# Patient Record
Sex: MALE | Race: WHITE
[De-identification: names, ages, dates, MRNs, and addresses within clinical notes are randomized per-mention and may not be internally consistent; named-entity substitution may affect disease eponyms.]

---

## 2021-12-03 ENCOUNTER — HOSPITAL ENCOUNTER (INPATIENT)
Dept: HOSPITAL 26 - MED | Age: 46
LOS: 4 days | Discharge: HOME | DRG: 720 | End: 2021-12-07
Payer: MEDICAID

## 2021-12-03 VITALS — DIASTOLIC BLOOD PRESSURE: 100 MMHG | SYSTOLIC BLOOD PRESSURE: 207 MMHG

## 2021-12-03 VITALS — HEIGHT: 74 IN | WEIGHT: 238 LBS | BODY MASS INDEX: 30.54 KG/M2

## 2021-12-03 VITALS — SYSTOLIC BLOOD PRESSURE: 159 MMHG | DIASTOLIC BLOOD PRESSURE: 85 MMHG

## 2021-12-03 DIAGNOSIS — E66.9: ICD-10-CM

## 2021-12-03 DIAGNOSIS — E11.621: ICD-10-CM

## 2021-12-03 DIAGNOSIS — I31.3: ICD-10-CM

## 2021-12-03 DIAGNOSIS — Z20.822: ICD-10-CM

## 2021-12-03 DIAGNOSIS — I70.249: ICD-10-CM

## 2021-12-03 DIAGNOSIS — E78.5: ICD-10-CM

## 2021-12-03 DIAGNOSIS — M21.622: ICD-10-CM

## 2021-12-03 DIAGNOSIS — Z86.16: ICD-10-CM

## 2021-12-03 DIAGNOSIS — J90: ICD-10-CM

## 2021-12-03 DIAGNOSIS — A41.9: Primary | ICD-10-CM

## 2021-12-03 DIAGNOSIS — L97.529: ICD-10-CM

## 2021-12-03 DIAGNOSIS — E11.00: ICD-10-CM

## 2021-12-03 DIAGNOSIS — L03.116: ICD-10-CM

## 2021-12-03 DIAGNOSIS — E44.0: ICD-10-CM

## 2021-12-03 DIAGNOSIS — E11.65: ICD-10-CM

## 2021-12-03 DIAGNOSIS — I10: ICD-10-CM

## 2021-12-03 DIAGNOSIS — R06.02: ICD-10-CM

## 2021-12-03 LAB
ALBUMIN FLD-MCNC: 2.8 G/DL (ref 3.4–5)
ANION GAP SERPL CALCULATED.3IONS-SCNC: 9.2 MMOL/L (ref 8–16)
APPEARANCE UR: CLEAR
AST SERPL-CCNC: 20 U/L (ref 15–37)
BARBITURATES UR QL SCN: NEGATIVE NG/ML
BASOPHILS # BLD AUTO: 0 K/UL (ref 0–0.22)
BASOPHILS NFR BLD AUTO: 0.4 % (ref 0–2)
BENZODIAZ UR QL SCN: NEGATIVE NG/ML
BILIRUB SERPL-MCNC: 0.7 MG/DL (ref 0–1)
BILIRUB UR QL STRIP: NEGATIVE
BUN SERPL-MCNC: 24 MG/DL (ref 7–18)
BZE UR QL SCN: NEGATIVE NG/ML
CANNABINOIDS UR QL SCN: NEGATIVE NG/ML
CHLORIDE SERPL-SCNC: 104 MMOL/L (ref 98–107)
CO2 SERPL-SCNC: 27.9 MMOL/L (ref 21–32)
COLOR UR: YELLOW
CREAT SERPL-MCNC: 1.1 MG/DL (ref 0.6–1.3)
EOSINOPHIL # BLD AUTO: 0 K/UL (ref 0–0.4)
EOSINOPHIL NFR BLD AUTO: 0.5 % (ref 0–4)
ERYTHROCYTE [DISTWIDTH] IN BLOOD BY AUTOMATED COUNT: 13 % (ref 11.6–13.7)
GFR SERPL CREATININE-BSD FRML MDRD: 93 ML/MIN (ref 90–?)
GLUCOSE SERPL-MCNC: 368 MG/DL (ref 74–106)
GLUCOSE UR STRIP-MCNC: (no result) MG/DL
HCT VFR BLD AUTO: 34.6 % (ref 36–52)
HGB BLD-MCNC: 11.6 G/DL (ref 12–18)
HGB UR QL STRIP: (no result)
LEUKOCYTE ESTERASE UR QL STRIP: NEGATIVE
LYMPHOCYTES # BLD AUTO: 0.5 K/UL (ref 2–11.5)
LYMPHOCYTES NFR BLD AUTO: 7 % (ref 20.5–51.1)
MAGNESIUM SERPL-MCNC: 2.2 MG/DL (ref 1.8–2.4)
MCH RBC QN AUTO: 29 PG (ref 27–31)
MCHC RBC AUTO-ENTMCNC: 34 G/DL (ref 33–37)
MCV RBC AUTO: 85 FL (ref 80–94)
MONOCYTES # BLD AUTO: 0.8 K/UL (ref 0.8–1)
MONOCYTES NFR BLD AUTO: 11.2 % (ref 1.7–9.3)
NEUTROPHILS # BLD AUTO: 5.8 K/UL (ref 1.8–7.7)
NEUTROPHILS NFR BLD AUTO: 80.9 % (ref 42.2–75.2)
NITRITE UR QL STRIP: NEGATIVE
OPIATES UR QL SCN: NEGATIVE NG/ML
PCP UR QL SCN: NEGATIVE NG/ML
PH UR STRIP: 5.5 [PH] (ref 5–9)
PHOSPHATE SERPL-MCNC: 2.4 MG/DL (ref 2.5–4.9)
PLATELET # BLD AUTO: 288 K/UL (ref 140–450)
POTASSIUM SERPL-SCNC: 4.1 MMOL/L (ref 3.5–5.1)
RBC # BLD AUTO: 4.07 MIL/UL (ref 4.2–6.1)
RBC #/AREA URNS HPF: (no result) /HPF (ref 0–5)
SODIUM SERPL-SCNC: 137 MMOL/L (ref 136–145)
WBC # BLD AUTO: 7.2 K/UL (ref 4.8–10.8)
WBC,URINE: (no result) /HPF (ref 0–5)

## 2021-12-03 RX ADMIN — DEXTROSE SCH MLS/HR: 50 INJECTION, SOLUTION INTRAVENOUS at 21:31

## 2021-12-03 RX ADMIN — Medication SCH DEV: at 16:30

## 2021-12-03 RX ADMIN — INSULIN LISPRO PRN UNITS: 100 INJECTION, SOLUTION INTRAVENOUS; SUBCUTANEOUS at 20:18

## 2021-12-03 RX ADMIN — Medication SCH DEV: at 20:18

## 2021-12-03 RX ADMIN — INSULIN LISPRO PRN UNITS: 100 INJECTION, SOLUTION INTRAVENOUS; SUBCUTANEOUS at 16:50

## 2021-12-03 RX ADMIN — SODIUM CHLORIDE SCH MLS/HR: 9 INJECTION, SOLUTION INTRAVENOUS at 14:55

## 2021-12-03 NOTE — NUR
45 Y/O MALE C/O LEFT FOOT PAIN 4/10 DESCRIBES AS ACHING WITH NON-PITTING EDEMA 
X3DAYS. PT DENIES TRAUMA OR INJURY TO AREA. PT ALSO REPORTS SOME SOB, SPO2 97% 
ON RA. PT STATES HE TOOK IBUPROFEN TODAY PRIOR TO ARRIVAL. DENIES N/V, DENIES 
FEVER/CHILLS.



PMH: DM, HLD



NKA

## 2021-12-03 NOTE — NUR
Patient awake, appears to be resting comfortably in bed. Vital Signs within 
normal limits. Respirations even and unlabored. Will continue to monitor.

## 2021-12-03 NOTE — NUR
RECEIVED BEDSIDE REPORT EARLIER FROM DAY RN FOR CONTINUITY OF CARE. RECEIVED PATIENT 
A/A/OX4, LAYING IN BED WATCHING TV. PATIENT NOT IN ANY DISTRESS AND NO COMPLAIN AT THIS 
TIME. PT DENIES ANY CHEST PAIN, SOB AND DIZZINESS. SR ON TELE MONITOR, HR- 82. PATIENT NOT 
ADMITTED AND NOTHING IS DONE YET PER DAY RN WISAL. IVF NS RUNNING @ 40 CC/HR AS 
ORDERED.NOTED PT HAS LEFT FOOT WOUND , OPEN AND NO DRAINAGE NOTED. WILL TAKE A PHOTO OF THE 
WOUND FOR DOCUMENTATION PURPOSES AND WITH THE PT CONSENT. ORIENTED THE PATIENT TO THE ROOM 
SETTING AND USE OF THE CALL LIGHT SYSTEM. DISCUSSED THE POC WITH THE PATIENT AND VERBALIZED 
UNDERSTANDING. CALL LIGHT WITHIN REACH. WILL CONTINUE POC AND MONITORING.

## 2021-12-03 NOTE — NUR
RECIEVED REPORT FROM CHARGE NURSE ZAHIRA, PATIENT WAS ADMITTED DUE TOFOOT PAIN, DX WITH 
FOOT ULCER, AND LEFT SWELLING, PATIENT HAS HISTORY OF DIABOTIC HYPERLIPEDMIA , PATIENT IS 
HERE AX4 IN ROOM AIR, HAS AN IV IN HIS LEFT AC, BREATHING EVEN UNLABORED, SKIN INTACT DENIES 
ANY PAIN. ALL SAFETY MEASURES ON PLACE, CALLS LIGHT WITHIN REACH

-------------------------------------------------------------------------------

Addendum: 12/03/21 at 1902 by Amanda Lai RN RN

-------------------------------------------------------------------------------

PATIENT VITAL SIGNS 160/88 HRAT RATE 86 SAT 97% RR 18 DENIES ANY PAIN, PATIENT LEFT  LEG 
SWOLLEN AND HAS  ULCER

## 2021-12-03 NOTE — NUR
Patient will be admitted to care of DR WELLINGTON. Admited to TELEMETRY.  Will go 
to room 120A. Belongings list completed.  Report to JUAN FRANCISCO ALEJANDRO.

## 2021-12-04 VITALS — SYSTOLIC BLOOD PRESSURE: 184 MMHG | DIASTOLIC BLOOD PRESSURE: 98 MMHG

## 2021-12-04 VITALS — SYSTOLIC BLOOD PRESSURE: 184 MMHG | DIASTOLIC BLOOD PRESSURE: 88 MMHG

## 2021-12-04 VITALS — DIASTOLIC BLOOD PRESSURE: 93 MMHG | SYSTOLIC BLOOD PRESSURE: 181 MMHG

## 2021-12-04 VITALS — SYSTOLIC BLOOD PRESSURE: 158 MMHG | DIASTOLIC BLOOD PRESSURE: 92 MMHG

## 2021-12-04 VITALS — SYSTOLIC BLOOD PRESSURE: 153 MMHG | DIASTOLIC BLOOD PRESSURE: 90 MMHG

## 2021-12-04 VITALS — DIASTOLIC BLOOD PRESSURE: 93 MMHG | SYSTOLIC BLOOD PRESSURE: 174 MMHG

## 2021-12-04 VITALS — SYSTOLIC BLOOD PRESSURE: 154 MMHG | DIASTOLIC BLOOD PRESSURE: 91 MMHG

## 2021-12-04 VITALS — DIASTOLIC BLOOD PRESSURE: 89 MMHG | SYSTOLIC BLOOD PRESSURE: 159 MMHG

## 2021-12-04 LAB
ANION GAP SERPL CALCULATED.3IONS-SCNC: 13.7 MMOL/L (ref 8–16)
BASOPHILS # BLD AUTO: 0 K/UL (ref 0–0.22)
BASOPHILS NFR BLD AUTO: 0.4 % (ref 0–2)
BUN SERPL-MCNC: 19 MG/DL (ref 7–18)
CHLORIDE SERPL-SCNC: 107 MMOL/L (ref 98–107)
CHOLEST/HDLC SERPL: 3.1 {RATIO} (ref 1–4.5)
CO2 SERPL-SCNC: 25.1 MMOL/L (ref 21–32)
CREAT SERPL-MCNC: 0.6 MG/DL (ref 0.6–1.3)
EOSINOPHIL # BLD AUTO: 0.1 K/UL (ref 0–0.4)
EOSINOPHIL NFR BLD AUTO: 1.3 % (ref 0–4)
ERYTHROCYTE [DISTWIDTH] IN BLOOD BY AUTOMATED COUNT: 13.1 % (ref 11.6–13.7)
GFR SERPL CREATININE-BSD FRML MDRD: 187 ML/MIN (ref 90–?)
GLUCOSE SERPL-MCNC: 131 MG/DL (ref 74–106)
HCT VFR BLD AUTO: 30.9 % (ref 36–52)
HDLC SERPL-MCNC: 38 MG/DL (ref 40–60)
HGB BLD-MCNC: 10.5 G/DL (ref 12–18)
LDLC SERPL CALC-MCNC: 63 MG/DL (ref 60–100)
LYMPHOCYTES # BLD AUTO: 1 K/UL (ref 2–11.5)
LYMPHOCYTES NFR BLD AUTO: 16.7 % (ref 20.5–51.1)
MCH RBC QN AUTO: 29 PG (ref 27–31)
MCHC RBC AUTO-ENTMCNC: 34 G/DL (ref 33–37)
MCV RBC AUTO: 84.6 FL (ref 80–94)
MONOCYTES # BLD AUTO: 0.8 K/UL (ref 0.8–1)
MONOCYTES NFR BLD AUTO: 12.8 % (ref 1.7–9.3)
NEUTROPHILS # BLD AUTO: 4.1 K/UL (ref 1.8–7.7)
NEUTROPHILS NFR BLD AUTO: 68.8 % (ref 42.2–75.2)
PLATELET # BLD AUTO: 265 K/UL (ref 140–450)
POTASSIUM SERPL-SCNC: 3.8 MMOL/L (ref 3.5–5.1)
RBC # BLD AUTO: 3.65 MIL/UL (ref 4.2–6.1)
SODIUM SERPL-SCNC: 142 MMOL/L (ref 136–145)
TRIGL SERPL-MCNC: 91 MG/DL (ref 30–150)
WBC # BLD AUTO: 5.9 K/UL (ref 4.8–10.8)

## 2021-12-04 RX ADMIN — INSULIN LISPRO PRN UNITS: 100 INJECTION, SOLUTION INTRAVENOUS; SUBCUTANEOUS at 21:44

## 2021-12-04 RX ADMIN — DEXTROSE SCH MLS/HR: 50 INJECTION, SOLUTION INTRAVENOUS at 21:23

## 2021-12-04 RX ADMIN — Medication SCH DEV: at 11:36

## 2021-12-04 RX ADMIN — DEXTROSE SCH MLS/HR: 50 INJECTION, SOLUTION INTRAVENOUS at 05:29

## 2021-12-04 RX ADMIN — PANTOPRAZOLE SODIUM SCH MG: 40 TABLET, DELAYED RELEASE ORAL at 08:22

## 2021-12-04 RX ADMIN — Medication SCH DEV: at 06:15

## 2021-12-04 RX ADMIN — Medication SCH DEV: at 21:31

## 2021-12-04 RX ADMIN — INSULIN LISPRO PRN UNITS: 100 INJECTION, SOLUTION INTRAVENOUS; SUBCUTANEOUS at 17:24

## 2021-12-04 RX ADMIN — DEXTROSE SCH MLS/HR: 50 INJECTION, SOLUTION INTRAVENOUS at 11:44

## 2021-12-04 RX ADMIN — HYDROCODONE BITARTRATE AND ACETAMINOPHEN PRN TAB: 5; 325 TABLET ORAL at 05:39

## 2021-12-04 RX ADMIN — SODIUM CHLORIDE SCH MLS/HR: 9 INJECTION, SOLUTION INTRAVENOUS at 11:43

## 2021-12-04 RX ADMIN — Medication SCH DEV: at 16:42

## 2021-12-04 RX ADMIN — INSULIN LISPRO PRN UNITS: 100 INJECTION, SOLUTION INTRAVENOUS; SUBCUTANEOUS at 11:51

## 2021-12-04 RX ADMIN — SODIUM CHLORIDE SCH MLS/HR: 9 INJECTION, SOLUTION INTRAVENOUS at 05:38

## 2021-12-04 NOTE — NUR
VITAL SIGNS STABLE, AFEBRILE, SATING 94% ON RA. BP STILL /93. PATIENT WAS GIVEN 
HYDRALAZINE EARLIER AND NOT DUE TO GIVE ANOTHER ONE. SR ON CARDIAC MONITOR, HR- 96. PT NOT 
IN ANY DISTRESS. NO COMPLAIN OF PAIN AT THIS TIME. CALL LIGHT WITHIN REACH. WILL CONTINUE 
POC.

## 2021-12-04 NOTE — NUR
RECEIVED PT FROM NIGHT RN, PT IS AWAKE, ALERT AND ORIENTED, ON RA, SAFETY PRECAUTION 
ENFORCED, IV LINE NOTED ON THE LAC G. 20 WITH IVF NS INFUSING AT 60ML/HR, INTACT AND PATENT, 
MEERA PAIN AND NO SIGN OF DISTRESS, WILL CONTINUE TO MONITOR PT.

## 2021-12-04 NOTE — NUR
PATIENT HAS BEEN SCREENED AND CATEGORIZED AS MODERATE NUTRITION RISK. PATIENT WILL BE SEEN 
WITHIN 3-5 DAYS OF ADMISSION.



12/6/2021-12/8/2021



[address]

## 2021-12-04 NOTE — NUR
Assumed care. A/o x 4. Spouse at the bed side. In no acute distress. Will be administering 
the PM Medications. Will continue to monitor.

## 2021-12-04 NOTE — NUR
NO ACUTE EVENTS THROUGHOUT THE NIGHT. PATIENT NOT IN ANY DISTRESS AND NO COMPLAIN AT THIS 
TIME. ALL NEEDS ATTENDED. CALL LIGHT WITHIN REACH. WILL ENDORSE THE PATIENT TO THE ONCOMING 
RN FOR CONTINUITY OF CARE.

## 2021-12-04 NOTE — NUR
PATIENT ASLEEP AT THIS TIME. VISIBLE CHEST RISE AND FALL NOTED. SAFETY MEASURES IN PLACED. 
WILL COTNINUE OBSERVATION.

## 2021-12-04 NOTE — NUR
VITAL SIGNS STABLE, AFEBRILE, SATING 96% ON RA. PT /88, HR-95. WILL GIVE PRN 
HYDRALAZINE TO LOWER THE PT BP. SR ON CARDIAC MONITOR, HR- 96. PT NOT IN ANY DISTRESS. NO 
COMPLAIN OF PAIN AT THIS TIME. CALL LIGHT WITHIN REACH. WILL CONTINUE POC.

## 2021-12-05 VITALS — SYSTOLIC BLOOD PRESSURE: 166 MMHG | DIASTOLIC BLOOD PRESSURE: 90 MMHG

## 2021-12-05 VITALS — SYSTOLIC BLOOD PRESSURE: 182 MMHG | DIASTOLIC BLOOD PRESSURE: 98 MMHG

## 2021-12-05 VITALS — SYSTOLIC BLOOD PRESSURE: 165 MMHG | DIASTOLIC BLOOD PRESSURE: 83 MMHG

## 2021-12-05 VITALS — SYSTOLIC BLOOD PRESSURE: 163 MMHG | DIASTOLIC BLOOD PRESSURE: 88 MMHG

## 2021-12-05 VITALS — SYSTOLIC BLOOD PRESSURE: 156 MMHG | DIASTOLIC BLOOD PRESSURE: 90 MMHG

## 2021-12-05 VITALS — SYSTOLIC BLOOD PRESSURE: 172 MMHG | DIASTOLIC BLOOD PRESSURE: 98 MMHG

## 2021-12-05 LAB
ANION GAP SERPL CALCULATED.3IONS-SCNC: 10.1 MMOL/L (ref 8–16)
BASOPHILS # BLD AUTO: 0 K/UL (ref 0–0.22)
BASOPHILS NFR BLD AUTO: 0.7 % (ref 0–2)
BUN SERPL-MCNC: 13 MG/DL (ref 7–18)
CHLORIDE SERPL-SCNC: 106 MMOL/L (ref 98–107)
CO2 SERPL-SCNC: 27.8 MMOL/L (ref 21–32)
CREAT SERPL-MCNC: 0.7 MG/DL (ref 0.6–1.3)
EOSINOPHIL # BLD AUTO: 0.1 K/UL (ref 0–0.4)
EOSINOPHIL NFR BLD AUTO: 1.9 % (ref 0–4)
ERYTHROCYTE [DISTWIDTH] IN BLOOD BY AUTOMATED COUNT: 13.1 % (ref 11.6–13.7)
GFR SERPL CREATININE-BSD FRML MDRD: 157 ML/MIN (ref 90–?)
GLUCOSE SERPL-MCNC: 180 MG/DL (ref 74–106)
HCT VFR BLD AUTO: 32.6 % (ref 36–52)
HGB BLD-MCNC: 11 G/DL (ref 12–18)
LYMPHOCYTES # BLD AUTO: 1.1 K/UL (ref 2–11.5)
LYMPHOCYTES NFR BLD AUTO: 23.4 % (ref 20.5–51.1)
MCH RBC QN AUTO: 28 PG (ref 27–31)
MCHC RBC AUTO-ENTMCNC: 34 G/DL (ref 33–37)
MCV RBC AUTO: 84 FL (ref 80–94)
MONOCYTES # BLD AUTO: 0.6 K/UL (ref 0.8–1)
MONOCYTES NFR BLD AUTO: 12.9 % (ref 1.7–9.3)
NEUTROPHILS # BLD AUTO: 2.9 K/UL (ref 1.8–7.7)
NEUTROPHILS NFR BLD AUTO: 61.1 % (ref 42.2–75.2)
PLATELET # BLD AUTO: 301 K/UL (ref 140–450)
POTASSIUM SERPL-SCNC: 3.9 MMOL/L (ref 3.5–5.1)
RBC # BLD AUTO: 3.88 MIL/UL (ref 4.2–6.1)
SODIUM SERPL-SCNC: 140 MMOL/L (ref 136–145)
WBC # BLD AUTO: 4.8 K/UL (ref 4.8–10.8)

## 2021-12-05 RX ADMIN — SODIUM CHLORIDE SCH MLS/HR: 9 INJECTION, SOLUTION INTRAVENOUS at 15:02

## 2021-12-05 RX ADMIN — INSULIN LISPRO PRN UNITS: 100 INJECTION, SOLUTION INTRAVENOUS; SUBCUTANEOUS at 17:23

## 2021-12-05 RX ADMIN — INSULIN LISPRO PRN UNITS: 100 INJECTION, SOLUTION INTRAVENOUS; SUBCUTANEOUS at 20:54

## 2021-12-05 RX ADMIN — Medication SCH DEV: at 17:13

## 2021-12-05 RX ADMIN — Medication SCH DEV: at 21:14

## 2021-12-05 RX ADMIN — HYDROCODONE BITARTRATE AND ACETAMINOPHEN PRN TAB: 5; 325 TABLET ORAL at 20:59

## 2021-12-05 RX ADMIN — INSULIN LISPRO PRN UNITS: 100 INJECTION, SOLUTION INTRAVENOUS; SUBCUTANEOUS at 11:54

## 2021-12-05 RX ADMIN — PANTOPRAZOLE SODIUM SCH MG: 40 TABLET, DELAYED RELEASE ORAL at 08:26

## 2021-12-05 RX ADMIN — DEXTROSE SCH MLS/HR: 50 INJECTION, SOLUTION INTRAVENOUS at 05:14

## 2021-12-05 RX ADMIN — Medication SCH MG: at 08:26

## 2021-12-05 RX ADMIN — DEXTROSE SCH MLS/HR: 50 INJECTION, SOLUTION INTRAVENOUS at 20:58

## 2021-12-05 RX ADMIN — Medication SCH DEV: at 07:55

## 2021-12-05 RX ADMIN — Medication SCH DEV: at 11:52

## 2021-12-05 RX ADMIN — DEXTROSE SCH MLS/HR: 50 INJECTION, SOLUTION INTRAVENOUS at 12:56

## 2021-12-05 NOTE — NUR
PATIENT'S BLOOD GLUCOSE . COVERED WITH 4 UNITS OF INSULIN PER MD ORDER. EDUCATED 
PATIENT ON MEDICATION ADMINISTERED. PATIENT VERBALIZED UNDERSTANDING OF ALL INFORMATION 
PROVIDED. WILL CONTINUE TO MONITOR.

## 2021-12-05 NOTE — NUR
ADMINISTERED ALL SCHEDULED MEDICATIONS. EDUCATED PATIENT ON MEDICATIONS ADMINISTERED. 
PATIENT TOLERATED WELL. WILL CONTINUE TO MONITOR.

## 2021-12-05 NOTE — NUR
DR CISNEROS AT BEDSIDE. ASSESSED PT L FOOT WOUND. INFORMED PT THAT POSSIBLE SCHEDULE SURGERY FOR 
TOMORROW FOR DEBRIDEMENT OF WOUND.  ALSO ORDERED NPO AFTER MIDNIGHT DUE TO POSSIBLE 
SURGERY TOMORROW MORNING. PER , IF SURGERY GETS PUSHED BACK, PT IS ALLOWED TO HAVE 
BREAKFAST. PT VERBALIZED AN UNDERSTANDING OF INFORMATION PROVIDED. WILL CONTINUE TO MONITOR.

## 2021-12-05 NOTE — NUR
RECEIVED REPORT FROM NIGHT SHIFT NURSE FOR CONTINUITY OF CARE. PATIENT IS IN BED AT THIS 
TIME WATCHING TELEVISION. PATIENT IS ALERT AND ORIENTED X4. PATIENT IS ON ROOM AIR. 
RESPIRATIONS ARE EVEN AND UNLABORED. NO SIGNS OF DISTRESS NOTED. PATIENT IS ON CCHO DIET, 
ABD IS NONTENDER, NONDISTENDED WITH BOWEL SOUNDS PRESENT. PATIENT IS CONTINENT OF BOWEL AND 
BLADDER. PATIENT HAS L AC 20G IV WITH NS RUNNING AT 60 ML/HR. PATIENT HAS L FOOT WOUND. SKIN 
IS WARM AND DRY. CALL LIGHT WITHIN REACH. ALL SAFETY MEASURES IN PLACE. WILL CONTINUE TO 
MONITOR.

## 2021-12-05 NOTE — NUR
ASSISTED PT TO AMBULATE TO RESTROOM. PT TOLERATED WELL. NO COMPLAINTS OF PAIN OR DISCOMFORT. 
WILL CONTINUE TO MONITOR.

## 2021-12-05 NOTE — NUR
Assumed care. A/O x 4. He has had a great day. Seen by podiatry. Plan is for I & D tomorrow 
morning. In the meantime we shall be applying a dressing on the left foot. Will continue to 
monitor.

## 2021-12-05 NOTE — NUR
ENDORSED PATIENT TO NIGHT SHIFT NURSE FOR CONTINUITY OF CARE. CALL LIGHT WITHIN REACH. ALL 
SAFETY MEASURES IN PLACE. PATIENT IS STABLE.

## 2021-12-05 NOTE — NUR
PERRY ABX ADMINISTERED PER MD ORDER. PT TOLERATED ADMINISTRATION. ALL SAFETY MEASURES IN 
PLACE. CALL LIGHT WITHIN REACH. WILL CONTINUE TO MONITOR.

## 2021-12-05 NOTE — NUR
Remains A/O x 4. SBP = 160. Hydralazine 25 vmg PO given. C/O headache 7/10 Norco given. BG = 
195 insulin coverage given. Will reassess later.

## 2021-12-06 VITALS — DIASTOLIC BLOOD PRESSURE: 71 MMHG | SYSTOLIC BLOOD PRESSURE: 139 MMHG

## 2021-12-06 VITALS — SYSTOLIC BLOOD PRESSURE: 174 MMHG | DIASTOLIC BLOOD PRESSURE: 93 MMHG

## 2021-12-06 VITALS — DIASTOLIC BLOOD PRESSURE: 92 MMHG | SYSTOLIC BLOOD PRESSURE: 158 MMHG

## 2021-12-06 VITALS — SYSTOLIC BLOOD PRESSURE: 145 MMHG | DIASTOLIC BLOOD PRESSURE: 95 MMHG

## 2021-12-06 VITALS — SYSTOLIC BLOOD PRESSURE: 149 MMHG | DIASTOLIC BLOOD PRESSURE: 91 MMHG

## 2021-12-06 VITALS — SYSTOLIC BLOOD PRESSURE: 152 MMHG | DIASTOLIC BLOOD PRESSURE: 85 MMHG

## 2021-12-06 LAB
ANION GAP SERPL CALCULATED.3IONS-SCNC: 10.2 MMOL/L (ref 8–16)
BASOPHILS # BLD AUTO: 0 K/UL (ref 0–0.22)
BASOPHILS NFR BLD AUTO: 0.4 % (ref 0–2)
BUN SERPL-MCNC: 13 MG/DL (ref 7–18)
CHLORIDE SERPL-SCNC: 106 MMOL/L (ref 98–107)
CO2 SERPL-SCNC: 28.4 MMOL/L (ref 21–32)
CREAT SERPL-MCNC: 0.6 MG/DL (ref 0.6–1.3)
EOSINOPHIL # BLD AUTO: 0.1 K/UL (ref 0–0.4)
EOSINOPHIL NFR BLD AUTO: 2.2 % (ref 0–4)
ERYTHROCYTE [DISTWIDTH] IN BLOOD BY AUTOMATED COUNT: 13 % (ref 11.6–13.7)
GFR SERPL CREATININE-BSD FRML MDRD: 187 ML/MIN (ref 90–?)
GLUCOSE SERPL-MCNC: 170 MG/DL (ref 74–106)
HCT VFR BLD AUTO: 31.7 % (ref 36–52)
HGB BLD-MCNC: 10.7 G/DL (ref 12–18)
LYMPHOCYTES # BLD AUTO: 1.2 K/UL (ref 2–11.5)
LYMPHOCYTES NFR BLD AUTO: 24.8 % (ref 20.5–51.1)
MCH RBC QN AUTO: 28 PG (ref 27–31)
MCHC RBC AUTO-ENTMCNC: 34 G/DL (ref 33–37)
MCV RBC AUTO: 83.8 FL (ref 80–94)
MONOCYTES # BLD AUTO: 0.6 K/UL (ref 0.8–1)
MONOCYTES NFR BLD AUTO: 13.3 % (ref 1.7–9.3)
NEUTROPHILS # BLD AUTO: 2.8 K/UL (ref 1.8–7.7)
NEUTROPHILS NFR BLD AUTO: 59.3 % (ref 42.2–75.2)
PLATELET # BLD AUTO: 307 K/UL (ref 140–450)
POTASSIUM SERPL-SCNC: 3.6 MMOL/L (ref 3.5–5.1)
RBC # BLD AUTO: 3.79 MIL/UL (ref 4.2–6.1)
SODIUM SERPL-SCNC: 141 MMOL/L (ref 136–145)
WBC # BLD AUTO: 4.7 K/UL (ref 4.8–10.8)

## 2021-12-06 PROCEDURE — 0JBR0ZZ EXCISION OF LEFT FOOT SUBCUTANEOUS TISSUE AND FASCIA, OPEN APPROACH: ICD-10-PCS | Performed by: PODIATRIST

## 2021-12-06 RX ADMIN — DEXTROSE SCH MLS/HR: 50 INJECTION, SOLUTION INTRAVENOUS at 05:05

## 2021-12-06 RX ADMIN — Medication SCH DEV: at 21:00

## 2021-12-06 RX ADMIN — Medication SCH MG: at 10:21

## 2021-12-06 RX ADMIN — INSULIN LISPRO PRN UNITS: 100 INJECTION, SOLUTION INTRAVENOUS; SUBCUTANEOUS at 14:09

## 2021-12-06 RX ADMIN — SODIUM CHLORIDE, SODIUM LACTATE, POTASSIUM CHLORIDE, AND CALCIUM CHLORIDE SCH MLS/HR: .6; .31; .03; .02 INJECTION, SOLUTION INTRAVENOUS at 09:10

## 2021-12-06 RX ADMIN — DEXTROSE SCH MLS/HR: 50 INJECTION, SOLUTION INTRAVENOUS at 13:00

## 2021-12-06 RX ADMIN — Medication SCH DEV: at 11:30

## 2021-12-06 RX ADMIN — Medication SCH DEV: at 16:30

## 2021-12-06 RX ADMIN — HYDROCODONE BITARTRATE AND ACETAMINOPHEN PRN TAB: 5; 325 TABLET ORAL at 05:25

## 2021-12-06 RX ADMIN — NIFEDIPINE SCH MG: 60 TABLET, FILM COATED, EXTENDED RELEASE ORAL at 14:23

## 2021-12-06 RX ADMIN — SODIUM CHLORIDE, SODIUM LACTATE, POTASSIUM CHLORIDE, AND CALCIUM CHLORIDE SCH MLS/HR: .6; .31; .03; .02 INJECTION, SOLUTION INTRAVENOUS at 17:30

## 2021-12-06 RX ADMIN — PANTOPRAZOLE SODIUM SCH MG: 40 TABLET, DELAYED RELEASE ORAL at 10:21

## 2021-12-06 RX ADMIN — DEXTROSE SCH MLS/HR: 50 INJECTION, SOLUTION INTRAVENOUS at 22:49

## 2021-12-06 RX ADMIN — INSULIN LISPRO PRN UNITS: 100 INJECTION, SOLUTION INTRAVENOUS; SUBCUTANEOUS at 22:23

## 2021-12-06 RX ADMIN — SODIUM CHLORIDE SCH MLS/HR: 9 INJECTION, SOLUTION INTRAVENOUS at 07:40

## 2021-12-06 RX ADMIN — Medication SCH DEV: at 07:40

## 2021-12-06 RX ADMIN — HYDROCODONE BITARTRATE AND ACETAMINOPHEN PRN TAB: 5; 325 TABLET ORAL at 19:09

## 2021-12-06 NOTE — NUR
DC PLANNING:

THE PATIENT PRESENTED TO THE ED FROM HOME WITH C/O WORSENING LE PAIN AND SWELLING WITH A 
WOUND TO THE SOLE OF HIS LEFT FOOT. H/O DM, HTN, COVID. ON EXAM SWELLING TO MID CALF OF LEFT 
LEG NOTED WITH A FOOT WOUND WITH PURULENT DRAINAGE. FOOT XRAY AND DOPPLER NEGATIVE FOR ACUTE 
FINDINGS, CT ANGIO NEGATIVE FOR PE. TAKEN TO OR TODAY BY DR CISNEROS FOR LEFT FOOT WOUND 
DEBRIDEMENT WITH I&D AND WOUND CULTURE. 

CM AND SW MET WITH THE PATIENT AT BEDSIDE, PATIENT IS Luxembourgish SPEAKING. HE HAS PRESUMPTIVE 
M/RAUL AND DOES NOT SEE A DOCTOR. HE MANAGES HIS DIABETES WITH PILLS HE GOT FROM [facility]. HE 
LIVES IN A SECOND STORY APARTMENT WITH HIS GIRLFRIEND AND HER SON. HE WORKS IN A WAREHOUSE 
AS A  AND HAS BEEN INDEPENDENT IN ALL ACTIVITIES UP UNTIL NOW. HE PLANS TO DC 
TO HIS AUNTS HOUSE AS SHE WILL ASSIST WITH HIS CARE, HIS GIRLFRIEND AND HE ARE CURRENTLY NOT 
ON GOOD TERMS. RESOURCES FOR LOCAL CLINICS WILL BE GIVEN TO HIM, ALSO REINFORCED NEED TO 
RESPOND WHEN [facility] CALLS FOR F/U FOR FULL SCOPE M/RAUL, IMPORTANCE OF FOLLOWING UP AT A 
CLINIC FOR DIABETIC MANAGEMENT ENDORSED.

CM WILL FOLLOW FOR NEEDS.

-------------------------------------------------------------------------------

Addendum: 12/06/21 at 1804 by Liliana Ndiaye CM

-------------------------------------------------------------------------------

PATIENT IS A 46-YEAR-OLD  MALE ADMITTED IN THE [facility]/ED ON 12/03/2021 DUE TO LEFT 
FOOT ULCER AND DIABETES. (PATIENT IS [place]ish SPEAKING ONLY). 

SW MET AND CM MEET WITH PATIENT AT BEDSIDE TO DISCUSS AND GATHER HIS COLLATERAL INFORMATION. 
 PATIENT REPORTED LIVING AT HOME WITH HIS SIGNIFICANT OTHER IN [place]. HOWEVER; THEY ARE 
HAVING SOME RELATIONSHIP ISSUES AND ARE  AT THESE TIME THEREFORE; HE HAS SEEK FOR 
FAMILY SUPPORT FROM HIS SISTER AND HIS AUNT GROVER LOWRY [phone]. PER PATIENT HE DO 
NOT HAVE ADVANCE DIRECTIVES AND WAS INTERESTED ON THE INFORMATION PACKET PROVIDED BY THESE 
WRITER. SW ALSO PROVIDED PATIENT WITH A RESOURCE LIST FOR EMERGENCY AND BASIC NEEDS AS WELL 
A LIST OF LOW INCOME HEALTH CARE CLINICS TO FOLLOW UP WITH HIS CARE AFTER HIS DISCHARGE FROM 
[facility].  PATIENT REPORTED NOT HAVING ANY ISSUES GETTING OR TAKING  MEDICATIONS, HOWEVER; HE 
HAS NOT BEEN CONSISTENT WITH HIS MEDICAL CARE FOR OVER A YEAR SINCE HE LAST SAW AN MD IN ONE 
OF THE CLINICS HE GOES IN [facility]; HOWEVER; PT. AGREED TO MAKE AN 
APPOINTMENT TO SEE AN MD FROM THE LIST OF CLINICS PROVIDED BY THESE WRITER WHEN HE IS READY 
FOR DISCHARGE. PATIENT STATED NOT HAVING OR NEEDING DME AT HOME AND BEEN ACTIVE AND 
INDEPENDENT TO AMBULATE UNTIL HIS RESENT ADMISSION. PATIENT REPORTED TO SW THAT HIS SISTER 
OR AUNT WILL BE ASSISTING HIM AFTER HIS DC FROM [facility] AND WILL BE PICKING HIM UP WHEN HE IS 
READY TO DC. BEV AND CM THANK HIM FOR HIS INF. AND LEFT THE ROOM.  SW WILL FOLLOW UP AS 
NEEDED.

## 2021-12-06 NOTE — NUR
SNACK FOR THE NIGHT GIVEN. FIXED PATIENT BEDDINGS. HEALTH TEACHING GIVEN ON THE IMPORTANCE 
OF DIET AND EXERCISE. VERBALIZED HE IS NOT FOND OF EATING VEGETABLES. IMPORTANCE OF FOOT 
CARE ALSO DISCUSSED WITH PATIENT. VERBALIZED UNDERSTANDING.

## 2021-12-06 NOTE — NUR
PT RESTING ON RA. BREATHING IS EVEN AND UNLABORED. NO S/S OF DISTRESS. ON MS. ALL SAFETY 
MEASURES IN PLACE, CALL LIGHT WITHIN REACH. PT IS STABLE.

## 2021-12-06 NOTE — NUR
RECIEVED BEDSIDE REPORT FROM NIGHT SHIFT NURSE FOR CONTINUTIY OF CARE. POC DISCUSSED. PT 
RESTING ON RA. BREATHING IS EVEN AND UNLABORED. NO S/S OF DISTRESS. PT IS ON TELE ON SR. ALL 
SAFETY MEASURES IN PLACE, CALL LIGHT WITHIN REACH. PT IS STABLE.

## 2021-12-06 NOTE — NUR
PT BACK FROM OR. DENIES PAIN. PT RESTING ON RA. BREATHING IS EVEN AND UNLABORED. NO S/S OF 
DISTRESS. PT IS ON TELE ON SR. ALL SAFETY MEASURES IN PLACE, CALL LIGHT WITHIN REACH. PT IS 
STABLE.

## 2021-12-06 NOTE — NUR
BT BLOOD SUGAR . COVERED WITH INSULIN AS PER MD ORDER. PT RESTING ON RA. BREATHING IS 
EVEN AND UNLABORED. NO S/S OF DISTRESS. PT IS ON TELE ON SR. ALL SAFETY MEASURES IN PLACE, 
CALL LIGHT WITHIN REACH. PT IS STABLE.

## 2021-12-06 NOTE — NUR
RECD. SITTING ON BED, AWAKE, A/OX4. RESPIRATION EVEN AND UNLABORED. IV OF NS INFUSING AT 60 
ML/HR, LEFT AC G20. AMBULATORY TO THE BR. LEFT FOOT COVERED WITH ACE WRAPPED BANDAGE, DRY 
AND INTACT. TOES WITH POSITIVE MOVEMENT AND SENSATION,  CAPILLARY REFILL LESS 3 SECONDS. 
MEDICATION FOR THE NIGHT DISCUSSED WITH PATIENT. VERBALIZED UNDERSTANDING. PAIN IN THE LEFT 
FOOT, 1/10 STATED TOLERABLE. WILL CALL NURSE WHEN NEEDING PAIN MEDICATION. ON IV 
ANTIBIOTICS.

## 2021-12-06 NOTE — NUR
ALL VS TAKEN FOR POST OP. PT IS DENIES PAIN., PT RESTING ON RA. BREATHING IS EVEN AND 
UNLABORED. NO S/S OF DISTRESS. PT IS ON TELE ON SR. ALL SAFETY MEASURES IN PLACE, CALL LIGHT 
WITHIN REACH. PT IS STABLE.

## 2021-12-07 VITALS — SYSTOLIC BLOOD PRESSURE: 103 MMHG | DIASTOLIC BLOOD PRESSURE: 52 MMHG

## 2021-12-07 VITALS — DIASTOLIC BLOOD PRESSURE: 52 MMHG | SYSTOLIC BLOOD PRESSURE: 103 MMHG

## 2021-12-07 VITALS — SYSTOLIC BLOOD PRESSURE: 116 MMHG | DIASTOLIC BLOOD PRESSURE: 66 MMHG

## 2021-12-07 LAB
ANION GAP SERPL CALCULATED.3IONS-SCNC: 14.4 MMOL/L (ref 8–16)
BASOPHILS # BLD AUTO: 0 K/UL (ref 0–0.22)
BASOPHILS NFR BLD AUTO: 0.5 % (ref 0–2)
BUN SERPL-MCNC: 17 MG/DL (ref 7–18)
CHLORIDE SERPL-SCNC: 104 MMOL/L (ref 98–107)
CO2 SERPL-SCNC: 23.9 MMOL/L (ref 21–32)
CREAT SERPL-MCNC: 0.7 MG/DL (ref 0.6–1.3)
EOSINOPHIL # BLD AUTO: 0.1 K/UL (ref 0–0.4)
EOSINOPHIL NFR BLD AUTO: 1.8 % (ref 0–4)
ERYTHROCYTE [DISTWIDTH] IN BLOOD BY AUTOMATED COUNT: 13.2 % (ref 11.6–13.7)
GFR SERPL CREATININE-BSD FRML MDRD: 157 ML/MIN (ref 90–?)
GLUCOSE SERPL-MCNC: 173 MG/DL (ref 74–106)
HCT VFR BLD AUTO: 34.3 % (ref 36–52)
HGB BLD-MCNC: 11.5 G/DL (ref 12–18)
LYMPHOCYTES # BLD AUTO: 1.2 K/UL (ref 2–11.5)
LYMPHOCYTES NFR BLD AUTO: 20.9 % (ref 20.5–51.1)
MCH RBC QN AUTO: 28 PG (ref 27–31)
MCHC RBC AUTO-ENTMCNC: 34 G/DL (ref 33–37)
MCV RBC AUTO: 83.8 FL (ref 80–94)
MONOCYTES # BLD AUTO: 0.7 K/UL (ref 0.8–1)
MONOCYTES NFR BLD AUTO: 12.1 % (ref 1.7–9.3)
NEUTROPHILS # BLD AUTO: 3.8 K/UL (ref 1.8–7.7)
NEUTROPHILS NFR BLD AUTO: 64.7 % (ref 42.2–75.2)
PLATELET # BLD AUTO: 353 K/UL (ref 140–450)
POTASSIUM SERPL-SCNC: 3.3 MMOL/L (ref 3.5–5.1)
RBC # BLD AUTO: 4.1 MIL/UL (ref 4.2–6.1)
SODIUM SERPL-SCNC: 139 MMOL/L (ref 136–145)
WBC # BLD AUTO: 5.9 K/UL (ref 4.8–10.8)

## 2021-12-07 RX ADMIN — INSULIN LISPRO PRN UNITS: 100 INJECTION, SOLUTION INTRAVENOUS; SUBCUTANEOUS at 12:09

## 2021-12-07 RX ADMIN — DEXTROSE SCH MLS/HR: 50 INJECTION, SOLUTION INTRAVENOUS at 05:37

## 2021-12-07 RX ADMIN — NIFEDIPINE SCH MG: 60 TABLET, FILM COATED, EXTENDED RELEASE ORAL at 09:33

## 2021-12-07 RX ADMIN — SODIUM CHLORIDE SCH MLS/HR: 9 INJECTION, SOLUTION INTRAVENOUS at 00:20

## 2021-12-07 RX ADMIN — PANTOPRAZOLE SODIUM SCH MG: 40 TABLET, DELAYED RELEASE ORAL at 09:32

## 2021-12-07 RX ADMIN — HYDROCODONE BITARTRATE AND ACETAMINOPHEN PRN TAB: 5; 325 TABLET ORAL at 05:10

## 2021-12-07 RX ADMIN — INSULIN LISPRO PRN UNITS: 100 INJECTION, SOLUTION INTRAVENOUS; SUBCUTANEOUS at 06:20

## 2021-12-07 RX ADMIN — Medication SCH DEV: at 12:08

## 2021-12-07 RX ADMIN — SODIUM CHLORIDE, SODIUM LACTATE, POTASSIUM CHLORIDE, AND CALCIUM CHLORIDE SCH MLS/HR: .6; .31; .03; .02 INJECTION, SOLUTION INTRAVENOUS at 01:50

## 2021-12-07 RX ADMIN — Medication SCH DEV: at 06:19

## 2021-12-07 RX ADMIN — DEXTROSE SCH MLS/HR: 50 INJECTION, SOLUTION INTRAVENOUS at 12:31

## 2021-12-07 NOTE — NUR
STILL AWAKE IN BED, WATCHING VIDEOS IN THE [employer]. REMINDED THAT IT IS TIME TO SLEEP. 
VERBALIZED HE WILL JUST FINISHED WHAT HE IS WATCHING.

## 2021-12-07 NOTE — NUR
RECEIVED REPORT FROM NIGHT SHIFT NURSE FOR CONTINUITY OF CARE. PT BREATHING IS EVEN AND 
UNLABORED. NO S/S OF DISTRESS NOTED. PT IS ON RA. PT IS ON MS. PT IS RESTING RIGHT NOW. LEFT 
AC 20G INTACT AND PATENT. ALL SAFETY MEASURES IN PLACE. PT IS STABLE.

## 2021-12-07 NOTE — NUR
PT BREATHING IS EVEN AND UNLABORED. NO S/S OF DISTRESS NOTED. PT IS ON RA. PT IS ON MS. TOOK 
OUT IV AND INTACT. PT SIGNED ALL DC FORMS. PT IS STABLE.DC PATIENT TO FAMILY TO FRONT OF 
HOSPITAL. GRATEFUL FOR CARE.

## 2023-07-17 ENCOUNTER — HOSPITAL ENCOUNTER (EMERGENCY)
Dept: HOSPITAL 26 - MED | Age: 48
LOS: 1 days | Discharge: HOME | End: 2023-07-18
Payer: MEDICAID

## 2023-07-17 ENCOUNTER — HOSPITAL ENCOUNTER (EMERGENCY)
Dept: HOSPITAL 26 - MED | Age: 48
Discharge: LEFT BEFORE DISCHARGE | End: 2023-07-17
Payer: SELF-PAY

## 2023-07-17 VITALS
TEMPERATURE: 97.9 F | SYSTOLIC BLOOD PRESSURE: 166 MMHG | RESPIRATION RATE: 16 BRPM | DIASTOLIC BLOOD PRESSURE: 103 MMHG | OXYGEN SATURATION: 99 % | HEART RATE: 93 BPM

## 2023-07-17 VITALS — BODY MASS INDEX: 29.52 KG/M2 | HEIGHT: 74 IN | WEIGHT: 230 LBS

## 2023-07-17 DIAGNOSIS — Z53.21: ICD-10-CM

## 2023-07-17 DIAGNOSIS — H57.10: Primary | ICD-10-CM

## 2023-07-17 DIAGNOSIS — E11.9: ICD-10-CM

## 2023-07-17 DIAGNOSIS — I10: ICD-10-CM

## 2023-07-17 DIAGNOSIS — Z79.4: ICD-10-CM

## 2023-07-17 DIAGNOSIS — H43.11: Primary | ICD-10-CM

## 2023-07-17 DIAGNOSIS — H53.142: ICD-10-CM

## 2023-07-17 DIAGNOSIS — Z79.899: ICD-10-CM

## 2023-07-17 LAB
ALBUMIN FLD-MCNC: 3 G/DL (ref 3.4–5)
ANION GAP SERPL CALCULATED.3IONS-SCNC: 12.1 MMOL/L (ref 8–16)
AST SERPL-CCNC: 14 U/L (ref 15–37)
BASOPHILS # BLD AUTO: 0.1 K/UL (ref 0–0.22)
BASOPHILS NFR BLD AUTO: 1 % (ref 0–2)
BILIRUB SERPL-MCNC: 0.4 MG/DL (ref 0–1)
BUN SERPL-MCNC: 45 MG/DL (ref 7–18)
CHLORIDE SERPL-SCNC: 104 MMOL/L (ref 98–107)
CO2 SERPL-SCNC: 27.7 MMOL/L (ref 21–32)
CREAT SERPL-MCNC: 1.9 MG/DL (ref 0.6–1.3)
EOSINOPHIL # BLD AUTO: 0.2 K/UL (ref 0–0.4)
EOSINOPHIL NFR BLD AUTO: 2 % (ref 0–4)
ERYTHROCYTE [DISTWIDTH] IN BLOOD BY AUTOMATED COUNT: 14.2 % (ref 11.6–13.7)
GFR SERPL CREATININE-BSD FRML MDRD: 49 ML/MIN (ref 90–?)
GLUCOSE SERPL-MCNC: 209 MG/DL (ref 74–106)
HCT VFR BLD AUTO: 35.2 % (ref 36–52)
HGB BLD-MCNC: 11.9 G/DL (ref 12–18)
LYMPHOCYTES # BLD AUTO: 1.5 K/UL (ref 2–11.5)
LYMPHOCYTES NFR BLD AUTO: 18.1 % (ref 20.5–51.1)
MCH RBC QN AUTO: 28 PG (ref 27–31)
MCHC RBC AUTO-ENTMCNC: 34 G/DL (ref 33–37)
MCV RBC AUTO: 82.3 FL (ref 80–94)
MONOCYTES # BLD AUTO: 1.1 K/UL (ref 0.8–1)
MONOCYTES NFR BLD AUTO: 13 % (ref 1.7–9.3)
NEUTROPHILS # BLD AUTO: 5.6 K/UL (ref 1.8–7.7)
NEUTROPHILS NFR BLD AUTO: 65.9 % (ref 42.2–75.2)
PLATELET # BLD AUTO: 368 K/UL (ref 140–450)
POTASSIUM SERPL-SCNC: 4.8 MMOL/L (ref 3.5–5.1)
RBC # BLD AUTO: 4.28 MIL/UL (ref 4.2–6.1)
SODIUM SERPL-SCNC: 139 MMOL/L (ref 136–145)
WBC # BLD AUTO: 8.5 K/UL (ref 4.8–10.8)

## 2023-07-17 NOTE — NUR
CALLED PT. NO ANSWER. 

PATIENT LEFT WITHOUT BEING SEEN BY DR. CARMONA. NO FURTHER CARE PROVIDED FOR 
PATIENT.

## 2023-07-18 VITALS
TEMPERATURE: 97.9 F | RESPIRATION RATE: 17 BRPM | DIASTOLIC BLOOD PRESSURE: 109 MMHG | HEART RATE: 81 BPM | OXYGEN SATURATION: 97 % | SYSTOLIC BLOOD PRESSURE: 187 MMHG

## 2023-07-18 LAB
APPEARANCE UR: CLEAR
BARBITURATES UR QL SCN: NEGATIVE NG/ML
BENZODIAZ UR QL SCN: NEGATIVE NG/ML
BILIRUB UR QL STRIP: NEGATIVE
BZE UR QL SCN: NEGATIVE NG/ML
CANNABINOIDS UR QL SCN: NEGATIVE NG/ML
COLOR UR: YELLOW
GLUCOSE UR STRIP-MCNC: (no result) MG/DL
HGB UR QL STRIP: (no result)
LEUKOCYTE ESTERASE UR QL STRIP: NEGATIVE
NITRITE UR QL STRIP: NEGATIVE
OPIATES UR QL SCN: NEGATIVE NG/ML
PCP UR QL SCN: NEGATIVE NG/ML
PH UR STRIP: 6 [PH] (ref 5–9)
RBC #/AREA URNS HPF: (no result) /HPF (ref 0–5)